# Patient Record
Sex: MALE
[De-identification: names, ages, dates, MRNs, and addresses within clinical notes are randomized per-mention and may not be internally consistent; named-entity substitution may affect disease eponyms.]

---

## 2023-06-08 PROBLEM — Z00.00 ENCOUNTER FOR PREVENTIVE HEALTH EXAMINATION: Status: ACTIVE | Noted: 2023-06-08

## 2023-06-12 ENCOUNTER — APPOINTMENT (OUTPATIENT)
Dept: ORTHOPEDIC SURGERY | Facility: CLINIC | Age: 38
End: 2023-06-12
Payer: COMMERCIAL

## 2023-06-12 DIAGNOSIS — Z78.9 OTHER SPECIFIED HEALTH STATUS: ICD-10-CM

## 2023-06-12 DIAGNOSIS — Z87.39 PERSONAL HISTORY OF OTHER DISEASES OF THE MUSCULOSKELETAL SYSTEM AND CONNECTIVE TISSUE: ICD-10-CM

## 2023-06-12 DIAGNOSIS — Z82.69 FAMILY HISTORY OF OTHER DISEASES OF THE MUSCULOSKELETAL SYSTEM AND CONNECTIVE TISSUE: ICD-10-CM

## 2023-06-12 PROCEDURE — 73610 X-RAY EXAM OF ANKLE: CPT | Mod: LT

## 2023-06-12 PROCEDURE — 20600 DRAIN/INJ JOINT/BURSA W/O US: CPT | Mod: 59,TA

## 2023-06-12 PROCEDURE — 99204 OFFICE O/P NEW MOD 45 MIN: CPT | Mod: 25

## 2023-06-12 PROCEDURE — 73620 X-RAY EXAM OF FOOT: CPT | Mod: LT

## 2023-06-12 RX ORDER — INDOMETHACIN 75 MG/1
75 CAPSULE, EXTENDED RELEASE ORAL
Refills: 0 | Status: ACTIVE | COMMUNITY

## 2023-06-13 LAB
CRP SERPL-MCNC: <3 MG/L
ERYTHROCYTE [SEDIMENTATION RATE] IN BLOOD BY WESTERGREN METHOD: 8 MM/HR

## 2023-06-14 ENCOUNTER — APPOINTMENT (OUTPATIENT)
Dept: ORTHOPEDIC SURGERY | Facility: CLINIC | Age: 38
End: 2023-06-14
Payer: COMMERCIAL

## 2023-06-14 DIAGNOSIS — M77.32 CALCANEAL SPUR, LEFT FOOT: ICD-10-CM

## 2023-06-14 DIAGNOSIS — M79.675 PAIN IN LEFT TOE(S): ICD-10-CM

## 2023-06-14 DIAGNOSIS — M76.60 ACHILLES TENDINITIS, UNSPECIFIED LEG: ICD-10-CM

## 2023-06-14 PROCEDURE — 99214 OFFICE O/P EST MOD 30 MIN: CPT

## 2023-06-14 NOTE — ASSESSMENT
[FreeTextEntry1] : 36 y/o male with 2 1/2  weeks of severe pain and inflammation at the left hallux MP joint partially better after steroid injection.  Its been unusually persistent his pain and limitation.  It did not respond at all to indomethacin surprisingly.  I doubt there is an infection given the partial improvement with steroids and lab results and cultures so far is negative.  \par No h/o trauma/injury.\par Normal uric acid, sedimentation rate and C-reactive protein.\par Unfortunately the boot was aggravating.  His heel pain.  Wearing a stiff rigid rocker-bottom shoe may help but that bothers the heel as well and it hurts to put his foot into a shoe because it hurts the hallux MP joint.\par I referred him for an MRI given the clinical picture with ongoing pain despite trying various medication and given the labs which are not pointing towards gout or infection.  Perhaps there is a chondral injury or something else structurally causing inflammation and pain in the joint.  He will get the MRI and I will see him back and/or call him with results.\par \par He also has insertional Achilles tendinopathy with heel spur.  For that I likely will have him start physical therapy once the hallux MP joint is feeling better.  He cannot do heel raises right now because it will aggravate the hallux MP joint.  He should stretch his Achilles.  He can apply Voltaren gel to the posterior heel as well as hallux MP joint.\par I may have him start physical therapy once the hallux MP joint is feeling better.\par He had done a lot of therapy in the past because he was rehabbing from a right Achilles issue and has done all of the exercises for his left side as well.  At some point we may need to do an MRI of his left ankle also given the pain in the Achilles but also gets other variable pains in the ankle and hindfoot anteriorly and medial and lateral.\par He will follow-up in a few weeks.

## 2023-06-14 NOTE — HISTORY OF PRESENT ILLNESS
[de-identified] : Mr. Mackay is a 38 y/o who comes in for evaluation for acute left hallux MP joint pain and chronic LEFT ankle pain. He has a history of multiple ankle sprains from playing football when he was younger. \par He has bilateral heel spurs. He has history of RIGHT Achilles surgery for bone spur.  Pain chronically now its been around the left heel.  Worse in that right now however is pain in the left forefoot.\par He was having severe pain in his left foot for just over 2 weeks now.  He woke up with the pain Memorial Day weekend for no reason around the hallux MP joint.  The week before he was having some ankle pain which went away.  Pain is 10 out of 10 and sharp and intense better with ice and worse with walking, bending but also just lying down.  He has been on indomethacin for a week without any relief at all.  He had blood work with his PCP on last Friday.  He saw a podiatrist last week who had started the indomethacin.  No blood work was taken at that time.  He never had any injections or aspiration.  No fevers or chills.  No prior history of gout.  No trauma or injury.

## 2023-06-14 NOTE — ASSESSMENT
[FreeTextEntry1] : 36 y/o male with 2 weeks of severe pain and inflammation at the left hallux MP joint and insertional calcific Achilles tendinopathy/Yosef's deformity.\par The inflammation left hallux MP joint has been much more severe and ongoing for a few weeks with unclear etiology at this point in time.  Differential includes gout which she was treated for without success with indomethacin versus infection or other inflammatory arthritis.  It does not look like trauma/injury.\par \par He was sent for sedimentation rate and C-reactive protein to see if these are elevated although they are nonspecific.  They could be followed to see if he is improving or not if there is any question.\par Since aspirated some fluid and sent for culture I may put him on antibiotics if he does not respond to the steroid injection.  The fluid I aspirated did not appear consistent with exudate or obvious infection.\par It is unusual that he did not respond at all to the indomethacin.\par He can stop the indomethacin and just take Aleve 1 or 2 tablets twice a day with meals.  The walking boot allows him to ambulate with less pain.  Warm soaks or ice and elevate.  I will call him with lab results but would like to see him back in 2 to 3 days as well to follow-up.  If culture looks like infection will start on antibiotics and he could need surgical irrigation and debridement.\par If it is gout then it likely would respond to the steroids.\par He also has insertional Achilles tendinopathy with heel spur.  For that I likely will have him start physical therapy once the hallux MP joint is feeling better.  Sometimes surgery is needed for that but would not be my first line of treatment.  We will follow-up with that further once the acute pain and inflammation at the hallux MP joint has resolved.\par I will see him in 2 to 3 days.  I will call him if I get any test results.  If he gets better quickly with the steroid then it points towards an inflammatory arthritis.  If he has a fever or chills or worsening erythema then we will start antibiotics.

## 2023-06-14 NOTE — PHYSICAL EXAM
[LE] : Sensory: Intact in bilateral lower extremities [Normal RLE] : Right Lower Extremity: No scars, rashes, lesions, ulcers, skin intact [Normal LLE] : Left Lower Extremity: No scars, rashes, lesions, ulcers, skin intact [Normal Touch] : sensation intact for touch [Normal] : Oriented to person, place, and time, insight and judgement were intact and the affect was normal [DP] : dorsalis pedis 2+ and symmetric bilaterally [de-identified] : LEFT foot and ankle\par Antalgic gait.\par There is erythema warmth, edema and tenderness centered over the hallux MP joint more medial and dorsal than lateral.\par Severe pain and limited motion hallux MP joint\par Intact EHL and FHL.\par Ankle motion is with 5 degrees dorsiflexion and 35 degrees plantarflexion.\par Yosef's deformity.  Tender posterior heel at Achilles insertion without erythema or edema.\par Normal Mancera test.\par Intact anterior tibial tendon, gastrocsoleus, peroneals, posterior tibial tendon.\par Sensation is intact.\par Foot is warm.  Normal capillary refill. [de-identified] : \par \par X-rays taken today of LEFT foot/ankle weightbearing 5 views showed large posterior calcaneal spur.  Ankle mortise is intact.\par Hallux MP joint is unremarkable.  No bone erosion and no fractures\par \par Labs performed 6/7/2023 showed uric acid normal 6.4 and normal white blood count of 5.3 and lymphocytes slightly high at 45 and neutrophils 42.9 which is at the low end of normal or just below normal.  No sedimentation rate or C-reactive protein.  His biochem profile is normal.

## 2023-06-14 NOTE — PHYSICAL EXAM
[LE] : Sensory: Intact in bilateral lower extremities [Normal RLE] : Right Lower Extremity: No scars, rashes, lesions, ulcers, skin intact [Normal LLE] : Left Lower Extremity: No scars, rashes, lesions, ulcers, skin intact [Normal Touch] : sensation intact for touch [Normal] : Oriented to person, place, and time, insight and judgement were intact and the affect was normal [de-identified] : LEFT foot and ankle\par More mildly antalgic gait.  Pain raising on his toes hallux MP joint.\par There is mild erythema warmth, edema and tenderness centered over the hallux MP joint more medial side of the joint\par Improved and less severe pain and limited motion hallux MP joint.  There is still pain with about 25 to 30 degrees dorsiflexion and 10 degrees plantarflexion.\par Intact EHL and FHL.\par Ankle motion is with 5 degrees dorsiflexion and 35 degrees plantarflexion.\par Yosef's deformity. Tender posterior lateral heel at Achilles insertion without erythema or edema.\par Normal Mancera test.\par Intact anterior tibial tendon, gastrocsoleus, peroneals, posterior tibial tendon.\par Sensation is intact.\par Foot is warm. Normal capillary refill. \par  [de-identified] : \par \par X-rays taken 6/12/23 of LEFT foot/ankle weightbearing 5 views showed large posterior calcaneal spur. Ankle mortise is intact.\par Hallux MP joint is unremarkable. No bone erosion and no fractures\par \par Labs performed 6/7/2023 showed uric acid normal 6.4 and normal white blood count of 5.3 and lymphocytes slightly high at 45 and neutrophils 42.9 which is at the low end of normal or just below normal. No sedimentation rate or C-reactive protein. His biochem profile is normal. \par \par

## 2023-06-14 NOTE — HISTORY OF PRESENT ILLNESS
[de-identified] : Mr. Mackay is a 36 y/o who comes in for follow up for acute left hallux MP joint pain and chronic LEFT ankle pain. He was here 2 days ago and back for follow up after culture and steroid injection.  He thinks a steroid injection partially helped.  Culture thus far is negative.\par He is feeling a lot better today, but not 100% as his foot is still red and sore but he has seen improvement.  Pain went from 10 out of 10 down to a 2-3 out of 10.\par The walking boot seem to make his heel pain and foot pain worse.  No fevers or chills.\par He stopped taking the indomethacin.  He is not taking any medication now.\par He also gets a posterior heel pain and also can get variable pain in the medial or lateral ankle which is more chronic.\par He had prior right Achilles surgery.  After that he did a lot of physical therapy for the Achilles and did good on both the left and right.  He has not noticed improvement.\par

## 2023-06-14 NOTE — PROCEDURE
[de-identified] : Aspiration left hallux MP joint.\par The dorsal hallux MP joint was cleaned with ChloraPrep and alcohol.  Under sterile conditions a 20-gauge needle was inserted just medial to the extensor hallucis longus tendon over the MP joint.  About 1.5 cc of slightly serosanguineous fluid was aspirated.  Initially fluid looked serous and not significantly cloudy.  It was sent for culture.  Band-Aid was applied.\par Steroid injection was then performed using a 25-gauge needle more dorsal lateral at the level of the MP joint.  Again the skin was cleaned with alcohol and then the needle was inserted and 1 cc of triamcinolone acetate 40 mg was injected.  Band-Aid applied.  He tolerated it well.\par \par Short pneumatic walking boot was applied.  This allowed him to walk with less pain.  He should wear shoe of similar height on the other side.  Cane if needed.

## 2023-06-19 LAB — BACTERIA FLD CULT: NORMAL

## 2023-06-20 ENCOUNTER — TRANSCRIPTION ENCOUNTER (OUTPATIENT)
Age: 38
End: 2023-06-20

## 2023-06-27 NOTE — HISTORY OF PRESENT ILLNESS
[de-identified] : Mr. Mackay is a 38 y/o who comes in for follow up for acute left hallux MP joint pain and chronic LEFT ankle pain. He was here 2 weeks ago for follow up after culture and steroid injection. He thinks a steroid injection partially helped. \par He is feeling a lot better today, but not 100% as his foot is still red and sore but he has seen

## 2023-06-27 NOTE — PHYSICAL EXAM
[LE] : Sensory: Intact in bilateral lower extremities [Normal RLE] : Right Lower Extremity: No scars, rashes, lesions, ulcers, skin intact [Normal LLE] : Left Lower Extremity: No scars, rashes, lesions, ulcers, skin intact [Normal Touch] : sensation intact for touch [Normal] : Oriented to person, place, and time, insight and judgement were intact and the affect was normal [de-identified] : LEFT foot and ankle\par More mildly antalgic gait. Pain raising on his toes hallux MP joint.\par There is mild erythema warmth, edema and tenderness centered over the hallux MP joint more medial side of the joint\par Improved and less severe pain and limited motion hallux MP joint. There is still pain with about 25 to 30 degrees dorsiflexion and 10 degrees plantarflexion.\par Intact EHL and FHL.\par Ankle motion is with 5 degrees dorsiflexion and 35 degrees plantarflexion.\par Yosef's deformity. Tender posterior lateral heel at Achilles insertion without erythema or edema.\par Normal Mancera test.\par Intact anterior tibial tendon, gastrocsoleus, peroneals, posterior tibial tendon.\par Sensation is intact.\par Foot is warm. Normal capillary refill.  [de-identified] : \par \par X-rays taken 6/12/23 of LEFT foot/ankle weightbearing 5 views showed large posterior calcaneal spur. Ankle mortise is intact.\par Hallux MP joint is unremarkable. No bone erosion and no fractures\par \par Labs performed 6/7/2023 showed uric acid normal 6.4 and normal white blood count of 5.3 and lymphocytes slightly high at 45 and neutrophils 42.9 which is at the low end of normal or just below normal. No sedimentation rate or C-reactive protein. His biochem profile is normal. \par \par

## 2023-06-29 ENCOUNTER — APPOINTMENT (OUTPATIENT)
Dept: ORTHOPEDIC SURGERY | Facility: CLINIC | Age: 38
End: 2023-06-29

## 2023-08-11 ENCOUNTER — NON-APPOINTMENT (OUTPATIENT)
Age: 38
End: 2023-08-11

## 2023-08-11 ENCOUNTER — APPOINTMENT (OUTPATIENT)
Dept: RHEUMATOLOGY | Facility: CLINIC | Age: 38
End: 2023-08-11
Payer: COMMERCIAL

## 2023-08-11 ENCOUNTER — LABORATORY RESULT (OUTPATIENT)
Age: 38
End: 2023-08-11

## 2023-08-11 VITALS
BODY MASS INDEX: 30.06 KG/M2 | DIASTOLIC BLOOD PRESSURE: 81 MMHG | OXYGEN SATURATION: 98 % | TEMPERATURE: 97.4 F | HEIGHT: 70 IN | SYSTOLIC BLOOD PRESSURE: 120 MMHG | WEIGHT: 210 LBS | HEART RATE: 66 BPM

## 2023-08-11 DIAGNOSIS — M25.572 PAIN IN LEFT ANKLE AND JOINTS OF LEFT FOOT: ICD-10-CM

## 2023-08-11 PROCEDURE — 99205 OFFICE O/P NEW HI 60 MIN: CPT | Mod: 25

## 2023-08-11 PROCEDURE — 36415 COLL VENOUS BLD VENIPUNCTURE: CPT

## 2023-08-11 RX ORDER — COLCHICINE 0.6 MG/1
0.6 TABLET ORAL
Qty: 60 | Refills: 1 | Status: ACTIVE | COMMUNITY
Start: 2023-08-11 | End: 1900-01-01

## 2023-08-11 NOTE — DATA REVIEWED
[FreeTextEntry1] : Labs 6/12/2023 ESR normal: 8 CRP normal Synovial fluid culture negative  6/7/2023 CBC normal CMP normal Ferritin 439 A1c 5.5 B12 737 Folate 15.4 Vitamin D 20.4  Uric acid 6.4  June 20, 2023  MRI left foot: Mild nonspecific subcutaneous edema on the dorsal medial aspect of the first metatarsal head.  Mild adventitial bursitis on the plantar lateral aspect of the fifth metatarsophalangeal joint.

## 2023-08-11 NOTE — PHYSICAL EXAM
[General Appearance - Alert] : alert [General Appearance - In No Acute Distress] : in no acute distress [General Appearance - Well Nourished] : well nourished [General Appearance - Well Developed] : well developed [General Appearance - Well-Appearing] : healthy appearing [Sclera] : the sclera and conjunctiva were normal [Examination Of The Oral Cavity] : the lips and gums were normal [Respiration, Rhythm And Depth] : normal respiratory rhythm and effort [Exaggerated Use Of Accessory Muscles For Inspiration] : no accessory muscle use [Edema] : there was no peripheral edema [] : no rash [Oriented To Time, Place, And Person] : oriented to person, place, and time [Impaired Insight] : insight and judgment were intact [Affect] : the affect was normal [Mood] : the mood was normal [FreeTextEntry1] : Left first MTP with erythema, edema, mild tenderness.  No tophi noted

## 2023-08-11 NOTE — ASSESSMENT
[FreeTextEntry1] : 37-year-old man referred for rheumatology evaluation.  Patient with onset of left first toe pain and swelling predominantly over the IP joint in May 2023, improved with steroid injection to the IP joint June 2023, patient was doing well until mid July following Achilles surgery, while left foot in cast, patient reports development of left MTP pain and swelling, which is still present at this time.  Left first MTP erythema, edema, and pain, most suggestive of gouty arthritis, although time course of initial symptoms not quite typical, will repeat labs today in office including CBC, CMP, ESR, CRP, rheumatoid factor, CCP, Lyme testing for completeness, in addition to uric acid level. Will start colchicine 0.6 mg twice daily for now, discussed low purine diet, discussed increasing fluid hydration, and possible uric acid lowering agent pending lab results today.  Discussed risks and benefits of colchicine, if develops severe GI symptoms will decrease to daily dosing.  Patient will follow-up in 2 weeks or sooner as needed.

## 2023-08-11 NOTE — HISTORY OF PRESENT ILLNESS
[FreeTextEntry1] : August 11, 2023 Patient referred for rheumatology evaluation Patient reports left first toe pain and swelling, initially started in May 2023 Left 1st MTP and IP joint Early June diagnosed with gout Was evaluated by sports medicine, initially IP joint swelling, status post steroid injection, improved cooled over the course of a couple of days Patient had left Achilles surgery for spur and Achilles reconstruction on July 18 Left foot was placed in a cast for 2 weeks Developed left first MTP swelling and pain Took indomethacin twice per day, if hurts, three times per day, with minimal benefit, now taking ibuprofen 400 mg twice daily Maternal GM gout No colchicine use in past Since initial symptoms in May, patient changed diet Stopped shrimp, shellfish, alcohol No other joints involved Now with left MTP erythema and swelling [facility] Walker for the left foot while outside at home In PT for the left Achilles No history of nephrolithiasis No previous history of joint pain or swelling No history of psoriasis No history of GC or chlamydia No history of IBD No history of uveitis  MRI report reviewed Labs reviewed Sports medicine evaluation reviewed

## 2023-08-14 LAB
ALBUMIN SERPL ELPH-MCNC: 4.4 G/DL
ALP BLD-CCNC: 58 U/L
ALT SERPL-CCNC: 13 U/L
ANION GAP SERPL CALC-SCNC: 9 MMOL/L
AST SERPL-CCNC: 19 U/L
BILIRUB SERPL-MCNC: 0.9 MG/DL
BUN SERPL-MCNC: 16 MG/DL
CALCIUM SERPL-MCNC: 9.6 MG/DL
CCP AB SER IA-ACNC: <8 UNITS
CHLORIDE SERPL-SCNC: 100 MMOL/L
CO2 SERPL-SCNC: 29 MMOL/L
CREAT SERPL-MCNC: 1.07 MG/DL
CRP SERPL-MCNC: <3 MG/L
EGFR: 91 ML/MIN/1.73M2
ERYTHROCYTE [SEDIMENTATION RATE] IN BLOOD BY WESTERGREN METHOD: 5 MM/HR
GLUCOSE SERPL-MCNC: 97 MG/DL
POTASSIUM SERPL-SCNC: 4.9 MMOL/L
PROT SERPL-MCNC: 7.7 G/DL
RF+CCP IGG SER-IMP: NEGATIVE
RHEUMATOID FACT SER QL: <10 IU/ML
SODIUM SERPL-SCNC: 138 MMOL/L
URATE SERPL-MCNC: 7.3 MG/DL

## 2023-09-25 ENCOUNTER — APPOINTMENT (OUTPATIENT)
Dept: RHEUMATOLOGY | Facility: CLINIC | Age: 38
End: 2023-09-25